# Patient Record
Sex: MALE | Race: WHITE | URBAN - METROPOLITAN AREA
[De-identification: names, ages, dates, MRNs, and addresses within clinical notes are randomized per-mention and may not be internally consistent; named-entity substitution may affect disease eponyms.]

---

## 2022-04-07 ENCOUNTER — HISTORICAL (OUTPATIENT)
Dept: ADMINISTRATIVE | Facility: HOSPITAL | Age: 22
End: 2022-04-07

## 2022-04-24 VITALS
SYSTOLIC BLOOD PRESSURE: 133 MMHG | OXYGEN SATURATION: 98 % | DIASTOLIC BLOOD PRESSURE: 84 MMHG | WEIGHT: 251.56 LBS | HEIGHT: 67 IN | BODY MASS INDEX: 39.48 KG/M2

## 2025-01-25 ENCOUNTER — OFFICE VISIT (OUTPATIENT)
Dept: URGENT CARE | Facility: CLINIC | Age: 25
End: 2025-01-25

## 2025-01-25 VITALS
HEIGHT: 70 IN | BODY MASS INDEX: 32.07 KG/M2 | SYSTOLIC BLOOD PRESSURE: 115 MMHG | DIASTOLIC BLOOD PRESSURE: 77 MMHG | RESPIRATION RATE: 17 BRPM | WEIGHT: 224 LBS | TEMPERATURE: 99 F | OXYGEN SATURATION: 98 % | HEART RATE: 89 BPM

## 2025-01-25 DIAGNOSIS — R05.9 COUGH, UNSPECIFIED TYPE: Primary | ICD-10-CM

## 2025-01-25 PROCEDURE — 99203 OFFICE O/P NEW LOW 30 MIN: CPT | Mod: ,,, | Performed by: FAMILY MEDICINE

## 2025-01-25 RX ORDER — DOXYCYCLINE 100 MG/1
100 CAPSULE ORAL 2 TIMES DAILY
Qty: 14 CAPSULE | Refills: 0 | Status: SHIPPED | OUTPATIENT
Start: 2025-01-25 | End: 2025-02-01

## 2025-01-25 RX ORDER — PREDNISONE 10 MG/1
30 TABLET ORAL DAILY
Qty: 15 TABLET | Refills: 0 | Status: SHIPPED | OUTPATIENT
Start: 2025-01-25 | End: 2025-01-30

## 2025-01-25 RX ORDER — PROMETHAZINE HYDROCHLORIDE AND DEXTROMETHORPHAN HYDROBROMIDE 6.25; 15 MG/5ML; MG/5ML
5 SYRUP ORAL EVERY 6 HOURS PRN
Qty: 120 ML | Refills: 0 | Status: SHIPPED | OUTPATIENT
Start: 2025-01-25 | End: 2025-01-31

## 2025-01-25 NOTE — PATIENT INSTRUCTIONS
Plan:   Likely a viral infection that needs to run its course.  These symptoms can last 7-10 days.  Medications sent to pharmacy  Start steroids tomorrow morning  Hold antibiotics for 6-7 days and start if symptoms persist  Cough syrup may cause drowsiness.  Do not drink alcohol or drive when taking it  Start taking an allergy pill daily such as claritin, zyrtec, allegrea or xyzal. Also start using a nasal steroid spray such as flonase or nasacort daily. If you are not being treated for high blood pressure, you can also take decongestant such as sudafed as needed. They can be purchased over the counter. If oral steroids were prescribed, start them tomorrow morning. Monitor for fever. Take tylenol/acetaminophen or ibuprofen as needed. Rest and hydrate. If symptoms persist or worsen, return to clinic or seek medical attention immediately.

## 2025-01-25 NOTE — PROGRESS NOTES
"Subjective:      Patient ID: Papa Cartwright is a 24 y.o. male.    Vitals:  height is 5' 10" (1.778 m) and weight is 101.6 kg (224 lb). His temperature is 98.6 °F (37 °C). His blood pressure is 115/77 and his pulse is 89. His respiration is 17 and oxygen saturation is 98%.     Chief Complaint: Cough     Patient is a 24 y.o. male who presents to urgent care with complaints of cough, chest congestion, sinus drip  x since this morning. Patient denies fever or sore throat.  Denies any shortness a breath or wheezing.  Denies any diarrhea.  States he did have 1 episode of vomiting after coughing fit.  States he did start Mucinex today as well.    Cough      Constitution: Negative.   HENT:  Positive for congestion.    Neck: neck negative.   Cardiovascular: Negative.    Eyes: Negative.    Respiratory:  Positive for cough.    Gastrointestinal: Negative.    Genitourinary: Negative.    Musculoskeletal: Negative.    Skin: Negative.    Allergic/Immunologic: Negative.    Neurological: Negative.    Hematologic/Lymphatic: Negative.       Objective:     Physical Exam   Constitutional: He is oriented to person, place, and time. He appears well-developed. He is cooperative.  Non-toxic appearance. He does not appear ill. No distress.   HENT:   Head: Normocephalic and atraumatic.   Ears:   Right Ear: Hearing and external ear normal.   Left Ear: Hearing and external ear normal.   Mouth/Throat: Mucous membranes are normal. No oropharyngeal exudate or posterior oropharyngeal erythema (postnasal drip).   Eyes: Conjunctivae and lids are normal.   Neck: Trachea normal and phonation normal. Neck supple. No edema present. No erythema present. No neck rigidity present.   Cardiovascular: Normal rate, regular rhythm and normal heart sounds.   Pulmonary/Chest: Effort normal and breath sounds normal. No stridor. No respiratory distress. He has no decreased breath sounds. He has no wheezes. He has no rhonchi. He has no rales.   Abdominal: Normal " "appearance.   Neurological: He is alert and oriented to person, place, and time. He exhibits normal muscle tone.   Skin: Skin is warm, intact and not diaphoretic.   Psychiatric: His speech is normal and behavior is normal. Mood, judgment and thought content normal.   Nursing note and vitals reviewed.         Previous History      Review of patient's allergies indicates:  No Known Allergies    History reviewed. No pertinent past medical history.  Current Outpatient Medications   Medication Instructions    doxycycline (MONODOX) 100 mg, Oral, 2 times daily    predniSONE (DELTASONE) 30 mg, Oral, Daily    promethazine-dextromethorphan (PROMETHAZINE-DM) 6.25-15 mg/5 mL Syrp 5 mLs, Oral, Every 6 hours PRN     Past Surgical History:   Procedure Laterality Date    TONSILLECTOMY       Family History   Problem Relation Name Age of Onset    No Known Problems Mother      No Known Problems Father         Social History     Tobacco Use    Smoking status: Never     Passive exposure: Never    Smokeless tobacco: Never   Substance Use Topics    Alcohol use: Yes     Comment: 3-4 times a week  4- 5    Drug use: Yes     Types: Marijuana     Comment: 4 times a day        Physical Exam      Vital Signs Reviewed   /77   Pulse 89   Temp 98.6 °F (37 °C)   Resp 17   Ht 5' 10" (1.778 m)   Wt 101.6 kg (224 lb)   SpO2 98%   BMI 32.14 kg/m²        Procedures    Procedures     Labs   No results found for this or any previous visit.    Assessment:     1. Cough, unspecified type        Plan:   Likely a viral infection that needs to run its course.  These symptoms can last 7-10 days.  Medications sent to pharmacy  Start steroids tomorrow morning  Hold antibiotics for 6-7 days and start if symptoms persist  Cough syrup may cause drowsiness.  Do not drink alcohol or drive when taking it  Start taking an allergy pill daily such as claritin, zyrtec, allegrea or xyzal. Also start using a nasal steroid spray such as flonase or nasacort daily. " If you are not being treated for high blood pressure, you can also take decongestant such as sudafed as needed. They can be purchased over the counter. If oral steroids were prescribed, start them tomorrow morning. Monitor for fever. Take tylenol/acetaminophen or ibuprofen as needed. Rest and hydrate. If symptoms persist or worsen, return to clinic or seek medical attention immediately.       Cough, unspecified type    Other orders  -     doxycycline (MONODOX) 100 MG capsule; Take 1 capsule (100 mg total) by mouth 2 (two) times daily. for 7 days  Dispense: 14 capsule; Refill: 0  -     predniSONE (DELTASONE) 10 MG tablet; Take 3 tablets (30 mg total) by mouth once daily. for 5 days  Dispense: 15 tablet; Refill: 0  -     promethazine-dextromethorphan (PROMETHAZINE-DM) 6.25-15 mg/5 mL Syrp; Take 5 mLs by mouth every 6 (six) hours as needed (cough).  Dispense: 120 mL; Refill: 0